# Patient Record
Sex: FEMALE | Race: WHITE
[De-identification: names, ages, dates, MRNs, and addresses within clinical notes are randomized per-mention and may not be internally consistent; named-entity substitution may affect disease eponyms.]

---

## 2017-09-03 ENCOUNTER — HEALTH MAINTENANCE LETTER (OUTPATIENT)
Age: 37
End: 2017-09-03

## 2017-11-02 ENCOUNTER — OFFICE VISIT (OUTPATIENT)
Dept: FAMILY MEDICINE | Facility: CLINIC | Age: 37
End: 2017-11-02

## 2017-11-02 VITALS
HEART RATE: 69 BPM | DIASTOLIC BLOOD PRESSURE: 76 MMHG | OXYGEN SATURATION: 96 % | HEIGHT: 68 IN | SYSTOLIC BLOOD PRESSURE: 132 MMHG | RESPIRATION RATE: 16 BRPM | WEIGHT: 198.8 LBS | BODY MASS INDEX: 30.13 KG/M2

## 2017-11-02 DIAGNOSIS — J30.1 CHRONIC ALLERGIC RHINITIS DUE TO POLLEN, UNSPECIFIED SEASONALITY: ICD-10-CM

## 2017-11-02 DIAGNOSIS — L60.0 INGROWING LEFT GREAT TOENAIL: ICD-10-CM

## 2017-11-02 DIAGNOSIS — L60.0 INGROWN RIGHT GREATER TOENAIL: Primary | ICD-10-CM

## 2017-11-02 PROCEDURE — 99203 OFFICE O/P NEW LOW 30 MIN: CPT | Performed by: FAMILY MEDICINE

## 2017-11-02 NOTE — PROGRESS NOTES
"Problem(s) Oriented visit        SUBJECTIVE:                                                    Amelie Boles is a 37 year old female who presents to clinic today for establishing care. She has a few issues.    1. [place] Great Toenails, bilaterally, chronically needing to be soaked and with occasional infections. She knows how to do proper nail care. She has to limit her choice of shoes due to this.     2. PND/ETD: Chronic discomfort on the left side. She already gets immunotherapy for allergies and takes a daily antihistamine, but notes left sided drainage causing irritation regularly.      Problem list, Medication list, Allergies, and Medical/Social/Surgical histories reviewed in EPIC and updated as appropriate.   Additional history: as documented    ROS:  5 point ROS completed and negative except noted above, including Gen, CV, Resp, GI, MS      Histories:   Patient Active Problem List   Diagnosis     CARDIOVASCULAR SCREENING; LDL GOAL LESS THAN 130     Mild intermittent asthma     Allergy     Allergic rhinitis     Past Surgical History:   Procedure Laterality Date     C ORAL SURGERY PROCEDURE        SECTION       ENT SURGERY      T& A     HC TOOTH EXTRACTION W/FORCEP         Social History   Substance Use Topics     Smoking status: Former Smoker     Packs/day: 0.25     Years: 10.00     Quit date: 2008     Smokeless tobacco: Never Used     Alcohol use 2.4 oz/week     4 Glasses of wine per week     Family History   Problem Relation Age of Onset     Type 1 Diabetes Mother      Myocardial Infarction Father 31           OBJECTIVE:                                                    /76  Pulse 69  Resp 16  Ht 1.727 m (5' 8\")  Wt 90.2 kg (198 lb 12.8 oz)  LMP 10/23/2017 (Exact Date)  SpO2 96%  BMI 30.23 kg/m2  Body mass index is 30.23 kg/(m^2).   GENERAL APPEARANCE: Alert, no acute distress  EYES: PERRL, EOM normal, conjunctiva and lids normal  HENT: right TM normal, left TM normal, nose " clear rhinorrhea, mucosal edema, throat/mouth:posterior pharyngeal cobblestoning.  NECK: No adenopathy,masses or thyromegaly  RESP: lungs clear to auscultation   CV: normal rate, regular rhythm, no murmur or gallop  MS: extremities normal, no peripheral edema  Bilateral great toes with ingrowing toenails on the lateral aspect of the nails, mild pink change and edema, no erythema or warmth.  NEURO: Alert, oriented, speech and mentation normal  PSYCH: mentation appears normal, affect and mood normal   Labs Resulted Today:   Results for orders placed or performed in visit on 06/27/14   Wet prep   Result Value Ref Range    Specimen Description Vagina     Wet Prep (A)      No Trichomonas seen  Clue cells seen  No yeast seen      Micro Report Status FINAL 06/27/2014      ASSESSMENT/PLAN:                                                        Amelie was seen today for new patient.    Diagnoses and all orders for this visit:    Ingrown right greater toenail  -     Referral to Podiatry/Foot & Ankle Surgery    Ingrowing left great toenail  -     Referral to Podiatry/Foot & Ankle Surgery    Chronic allergic rhinitis due to pollen, unspecified seasonality          Patient Instructions   Nasaline: for nasal saline lavage      The following health maintenance items are reviewed in Epic and correct as of today:  Health Maintenance   Topic Date Due     ASTHMA CONTROL TEST Q6 MOS  12/27/2014     ASTHMA ACTION PLAN Q1 YR  01/02/2015     PAP SCREENING Q3 YR (SYSTEM ASSIGNED)  11/01/2015     TETANUS IMMUNIZATION (SYSTEM ASSIGNED)  01/01/2021     INFLUENZA VACCINE (SYSTEM ASSIGNED)  Addressed       Marichuy Cardenas MD  [facility]  Family Practice  [facility]  [phone]    For any issues my office # is [phone]

## 2017-11-02 NOTE — MR AVS SNAPSHOT
"              After Visit Summary   2017    Amelie Boles    MRN: [id number]           Patient Information     Date Of Birth          1980        Visit Information        Provider Department      2017 2:30 PM Marichuy Cardenas MD [facility]        Today's Diagnoses     Ingrown right greater toenail    -  1    Ingrowing left great toenail          Care Instructions    Nasaline: for nasal saline lavage          Follow-ups after your visit        Who to contact     If you have questions or need follow up information about today's clinic visit or your schedule please contact [facility] directly at [phone].  Normal or non-critical lab and imaging results will be communicated to you by [facility] Downshart, letter or phone within 4 business days after the clinic has received the results. If you do not hear from us within 7 days, please contact the clinic through Embarr Downshart or phone. If you have a critical or abnormal lab result, we will notify you by phone as soon as possible.  Submit refill requests through [employer] or call your pharmacy and they will forward the refill request to us. Please allow 3 business days for your refill to be completed.          Additional Information About Your Visit        MyChart Information     [employer] lets you send messages to your doctor, view your test results, renew your prescriptions, schedule appointments and more. To sign up, go to [email or web address] . Click on \"Log in\" on the left side of the screen, which will take you to the Welcome page. Then click on \"Sign up Now\" on the right side of the page.     You will be asked to enter the access code listed below, as well as some personal information. Please follow the directions to create your username and password.     Your access code is: [email or web address]  Expires: 2018  3:24 PM     Your access code will  in 90 days. If you need help or a new code, please call your [place] clinic or " "[phone].        Care EveryWhere ID     This is your Care EveryWhere ID. This could be used by other organizations to access your [facility] medical records  [id number]        Your Vitals Were     Pulse Respirations Height Last Period Pulse Oximetry BMI (Body Mass Index)    69 16 1.727 m (5' 8\") 10/23/2017 (Exact Date) 96% 30.23 kg/m2       Blood Pressure from Last 3 Encounters:   11/02/17 132/76   10/28/15 126/78   06/27/14 130/80    Weight from Last 3 Encounters:   11/02/17 90.2 kg (198 lb 12.8 oz)   10/28/15 87.5 kg (193 lb)   06/27/14 83.5 kg (184 lb)              Today, you had the following     No orders found for display       Primary Care Provider Office Phone # Fax #    Marichuy Karly Cardenas [phone]       [facility] [address] [place] [address]        Equal Access to Services     [facility] : Hadii aad ku hadasho Soomaali, waaxda luqadaha, qaybta kaalmada adeegyada, waxay sidrain haypamela velásquez . So [facility] [phone].    ATENCIÓN: Si habla español, tiene a perez disposición servicios gratuitos de asistencia lingüística. Llame al [phone].    We comply with applicable federal civil rights laws and [place] laws. We do not discriminate on the basis of race, color, national origin, age, disability, sex, sexual orientation, or gender identity.            Thank you!     Thank you for choosing [facility]  for your care. Our goal is always to provide you with excellent care. Hearing back from our patients is one way we can continue to improve our services. Please take a few minutes to complete the written survey that you may receive in the mail after your visit with us. Thank you!             Your Updated Medication List - Protect others around you: Learn how to safely use, store and throw away your medicines at www.disposemymeds.org.          This list is accurate as of: 11/2/17  3:24 PM.  Always use your most recent med list.             "       Brand Name Dispense Instructions for use Diagnosis    albuterol 108 (90 BASE) MCG/ACT Inhaler    PROAIR HFA/PROVENTIL HFA/VENTOLIN HFA     Inhale 1-2 puffs into the lungs every 6 hours as needed.        ALLERGEN IMMUNOTHERAPY PRESCRIPTION           Prenatal Vit w/ Fe Bisg-FA 25-1 MG Tabs      Take 1 tablet by mouth daily        PULMICORT FLEXHALER 180 MCG/ACT inhaler   Generic drug:  budesonide      Inhale 2 puffs into the lungs 2 times daily.        ZYRTEC ALLERGY 10 MG tablet   Generic drug:  cetirizine      Take 10 mg by mouth as needed

## 2017-11-02 NOTE — LETTER
[facility]  [address] [place] [address] [phone]      November 2, 2017      Amelie Elvi  [address][1] [id number]      EMERGENCY CARE PLAN  Presenting Problem Treatment Plan   Questions or concerns during clinic hours I will call the clinic directly:    [facility]  [address] [place], [place] [address] [phone]   Questions or concerns outside clinic hours I will call the after-hours on-call line at [phone]   Patient needs to schedule an appointment I will call the scheduling team during business hours at [phone]   Same day treatment  I will call the clinic first, then urgent care and express care if needed   Clinic Care Coordinators ALLAN Landa:  [phone]  Karyna Camacho RN: [phone]   Crisis Services:  Behavioral or Mental Health [facility] ([facility])   [phone]   Emergency treatment--Immediately CALL [phone]

## 2020-12-06 ENCOUNTER — HEALTH MAINTENANCE LETTER (OUTPATIENT)
Age: 40
End: 2020-12-06

## 2021-09-25 ENCOUNTER — HEALTH MAINTENANCE LETTER (OUTPATIENT)
Age: 41
End: 2021-09-25

## 2022-01-15 ENCOUNTER — HEALTH MAINTENANCE LETTER (OUTPATIENT)
Age: 42
End: 2022-01-15

## 2022-12-26 ENCOUNTER — HEALTH MAINTENANCE LETTER (OUTPATIENT)
Age: 42
End: 2022-12-26

## 2023-04-22 ENCOUNTER — HEALTH MAINTENANCE LETTER (OUTPATIENT)
Age: 43
End: 2023-04-22

## 2024-02-04 ENCOUNTER — HEALTH MAINTENANCE LETTER (OUTPATIENT)
Age: 44
End: 2024-02-04